# Patient Record
Sex: MALE
[De-identification: names, ages, dates, MRNs, and addresses within clinical notes are randomized per-mention and may not be internally consistent; named-entity substitution may affect disease eponyms.]

---

## 2017-03-25 ENCOUNTER — HOSPITAL ENCOUNTER (EMERGENCY)
Dept: HOSPITAL 14 - H.ER | Age: 67
Discharge: HOME | End: 2017-03-25
Payer: MEDICARE

## 2017-03-25 VITALS
RESPIRATION RATE: 16 BRPM | OXYGEN SATURATION: 99 % | TEMPERATURE: 98.6 F | SYSTOLIC BLOOD PRESSURE: 150 MMHG | DIASTOLIC BLOOD PRESSURE: 72 MMHG

## 2017-03-25 VITALS — HEART RATE: 77 BPM

## 2017-03-25 DIAGNOSIS — I12.9: ICD-10-CM

## 2017-03-25 DIAGNOSIS — R07.9: Primary | ICD-10-CM

## 2017-03-25 DIAGNOSIS — R05: ICD-10-CM

## 2017-03-25 DIAGNOSIS — Z79.4: ICD-10-CM

## 2017-03-25 DIAGNOSIS — E10.9: ICD-10-CM

## 2017-03-25 DIAGNOSIS — N39.0: ICD-10-CM

## 2017-03-25 DIAGNOSIS — E78.00: ICD-10-CM

## 2017-03-25 DIAGNOSIS — N18.9: ICD-10-CM

## 2017-03-25 LAB
BASOPHILS # BLD AUTO: 0.1 K/UL (ref 0–0.2)
BASOPHILS NFR BLD: 0.7 % (ref 0–2)
BUN SERPL-MCNC: 43 MG/DL (ref 9–20)
CALCIUM SERPL-MCNC: 9.2 MG/DL (ref 8.4–10.2)
CHLORIDE SERPL-SCNC: 111 MMOL/L (ref 98–107)
CO2 SERPL-SCNC: 19 MMOL/L (ref 22–30)
EOSINOPHIL # BLD AUTO: 0.2 K/UL (ref 0–0.7)
EOSINOPHIL NFR BLD: 2.2 % (ref 0–4)
ERYTHROCYTE [DISTWIDTH] IN BLOOD BY AUTOMATED COUNT: 14.5 % (ref 11.5–14.5)
GLUCOSE SERPL-MCNC: 173 MG/DL (ref 75–110)
HCT VFR BLD CALC: 35.7 % (ref 35–51)
LYMPHOCYTES # BLD AUTO: 2.1 K/UL (ref 1–4.3)
LYMPHOCYTES NFR BLD AUTO: 20.1 % (ref 20–40)
MCH RBC QN AUTO: 29.7 PG (ref 27–31)
MCHC RBC AUTO-ENTMCNC: 32.5 G/DL (ref 33–37)
MCV RBC AUTO: 91.4 FL (ref 80–94)
MONOCYTES # BLD: 1.2 K/UL (ref 0–0.8)
MONOCYTES NFR BLD: 11.1 % (ref 0–10)
NEUTROPHILS # BLD: 7 K/UL (ref 1.8–7)
NEUTROPHILS NFR BLD AUTO: 65.9 % (ref 50–75)
NRBC BLD AUTO-RTO: 0 % (ref 0–0)
PLATELET # BLD: 443 K/UL (ref 130–400)
PMV BLD AUTO: 7.9 FL (ref 7.2–11.7)
POTASSIUM SERPL-SCNC: 4.9 MMOL/L (ref 3.6–5)
SODIUM SERPL-SCNC: 146 MMOL/L (ref 132–148)
TROPONIN I SERPL-MCNC: 0.01 NG/ML (ref 0–0.12)
WBC # BLD AUTO: 10.6 K/UL (ref 4.8–10.8)

## 2017-03-25 PROCEDURE — 96372 THER/PROPH/DIAG INJ SC/IM: CPT

## 2017-03-25 PROCEDURE — 99283 EMERGENCY DEPT VISIT LOW MDM: CPT

## 2017-03-25 PROCEDURE — 80048 BASIC METABOLIC PNL TOTAL CA: CPT

## 2017-03-25 PROCEDURE — 93005 ELECTROCARDIOGRAM TRACING: CPT

## 2017-03-25 PROCEDURE — 85025 COMPLETE CBC W/AUTO DIFF WBC: CPT

## 2017-03-25 PROCEDURE — 84484 ASSAY OF TROPONIN QUANT: CPT

## 2017-03-25 PROCEDURE — 83880 ASSAY OF NATRIURETIC PEPTIDE: CPT

## 2017-03-25 PROCEDURE — 71020: CPT

## 2017-03-25 NOTE — RAD
HISTORY:

 chest pain 



COMPARISON:

No prior.



TECHNIQUE:

Chest PA and lateral



FINDINGS:



LUNGS:

No active pulmonary disease.



PLEURA:

No significant pleural effusion identified. No pneumothorax apparent.



CARDIOVASCULAR:

Normal.



OSSEOUS STRUCTURES:

No significant abnormalities.



VISUALIZED UPPER ABDOMEN:

Normal.



OTHER FINDINGS:

None.



IMPRESSION:

No active disease.

## 2017-03-25 NOTE — ED PDOC
HPI: Chest Pain


Time Seen by Provider: 17 13:06


Chief Complaint (Nursing): Chest Pain


Chief Complaint (Provider): Chest pain


History Per: Patient


History/Exam Limitations: no limitations


Onset/Duration Of Symptoms: Days (5x)


Current Symptoms Are (Timing): Still Present


Severity: Moderate


Associated Symptoms: denies: Dyspnea, Syncope


Additional Complaint(s): 


66 year old male patient with a pertinent medical history of HTN and diabetes 

presents to the ED with complaints of left sided chest pain and a cough that 

started 5x days ago. He reports that he has phlegm, and his cough is productive 

of green sputum. The chest pain worsens when he coughs. He denies having 

shortness of breath, dizziness, syncope, and leg swelling. 





PMD: [facility]








Past Medical History


Reviewed: Historical Data, Nursing Documentation, Vital Signs


Vital Signs: 


 Last Vital Signs











Temp  98.6 F   17 13:00


 


Pulse  77   17 16:24


 


Resp  16   17 13:00


 


BP  150/72   17 13:00


 


Pulse Ox  99   17 16:24














- Medical History


PMH: Diabetes (type I and II), HTN, Hypercholesterolemia, Chronic Kidney Disease


   Denies: CAD, CVA





- Surgical History


Surgical History: Appendectomy


   Denies: CABG





- Family History


Family History: States: Unknown Family Hx





- Social History


Alcohol: None


Drugs: Denies





- Home Medications


Home Medications: 


 Ambulatory Orders











 Medication  Instructions  Recorded


 


Ciprofloxacin HCl [Cipro] 500 mg PO BID #14 tab 10/24/15


 


SITagliptin [Januvia] 50 mg PO DAILY #0 tab 10/24/15


 


cloNIDine [clonidine HCl] 0.1 mg PO BID #0 tab 10/24/15


 


Diphenhydramine Hydrochlorid 50 mg PO Q6 #20 cap 11/15/15





[Benadryl]  


 


Albuterol HFA [Ventolin HFA 90 2 puff IH [id number] PRN #1 inh 17





mcg/actuation (8 g)]  


 


Nitrofurantoin Macrocrystals 100 mg PO BID #14 cap 17





[Macrobid]  


 


Promethazine/Codeine 5 ml PO Q6 #100 ml 17





[Phenergan/Codeine Oral Syrup]  














- Allergies


Allergies/Adverse Reactions: 


 Allergies











Allergy/AdvReac Type Severity Reaction Status Date / Time


 


No Known Allergies Allergy   Verified 16 19:00














ANNETTE Risk Score for UA/NSTEMI





- ANNETTE Risk Score


Age > 64: YES


3 or more CAD Risk Factors: YES


Known CAD (Stenosis greater than 50%): NO


Aspirin use in past 7 days: NO


Severe Angina: NO


EKG ST changes greater than 0.5mm: NO


Positive Cardiac Marker: NO


ANNETTE Score: 2


Risk %: 8%





Wells Criteria for PE





- Wells Criteria for Pulmonary Embolism


Clinical Signs and Symptoms of DVT: No


P.E is #1 Diagnosis, or Equally Likely: No


Heart Rate >100: No


Immobilization at least 3 days;Surgery previous 4 weeks: No


Previous, objectively diagnosed PE or DVT: No


Hemoptysis: No


Malignancy w/treatment within 6 months, or palliative: No


Total Score: 0





Review of Systems


ROS Statement: Except As Marked, All Systems Reviewed And Found Negative


Constitutional: Negative for: Fever


Cardiovascular: Positive for: Chest Pain


Respiratory: Positive for: Cough, Sputum (green).  Negative for: Shortness of 

Breath


Musculoskeletal: Negative for: Leg Pain (no leg swelling)


Neurological: Negative for: Dizziness, Other (syncope)





Physical Exam





- Reviewed


Nursing Documentation Reviewed: Yes


Vital Signs Reviewed: Yes





- Physical Exam


Appears: Positive for: Well, Non-toxic, No Acute Distress


Head Exam: Positive for: ATRAUMATIC, NORMOCEPHALIC


Skin: Positive for: Normal Color, Warm, Dry


Cardiovascular/Chest: Positive for: Regular Rate, Rhythm.  Negative for: Chest 

Non Tender (left side chest wall is tender to palpation)


Respiratory: Positive for: Normal Breath Sounds.  Negative for: Respiratory 

Distress


Gastrointestinal/Abdominal: Positive for: Normal Exam, Soft.  Negative for: 

Tenderness


Extremity: Positive for: Normal ROM.  Negative for: Tenderness (no leg 

tenderness), Swelling (no leg swelling)


Neurologic/Psych: Positive for: Alert, Oriented (3x)





- Laboratory Results


Result Diagrams: 


 17 13:45





 17 13:45


Urine dip results: Positive for: Leukocyte Esterase, Blood





- ECG


ECG: Positive for: Interpreted By Me, Viewed By Me


ECG Rhythm: Positive for: Normal QRS, Normal ST Segment, Sinus Rhythm.  

Negative for: ST/T Changes


Rate: 77


O2 Sat by Pulse Oximetry: 99 (RA)


Pulse Ox Interpretation: Normal





- Radiology


X-Ray: Viewed By Me, Read By Radiologist


X-Ray Interpretation: No Acute Disease





Medical Decision Making


Medical Decision Makin:06


Initial impression: 66 year old male with chest pain and a cough. Differential 

diagnoses include but are not limited to bronchitis, pneumonia, and 

musculoskeletal chest pain. Less than likely but still considered: ACS


Initial plan:


* XRay chest 2 views


*  EKG


* b-type natriuretic peptide


* BMP


* troponin I


* udip


* CBC with differential


* phenergan/codeine 5ml PO


* toradol 30mg IM


* US transvaginal


* reevaluation


 


--------------------------------------------------------------------------------

----------------- 


Scribe Attestation:


Documented by Tawanna Gary, acting as a scribe for Vanessa Sawyer MD.


 


Provider Scribe Attestation:


All medical record entries made by the Scribe were at my direction and 

personally dictated by me. I have reviewed the chart and agree that the record 

accurately reflects my personal performance of the history, physical exam, 

medical decision making, and the department course for this patient. I have 

also personally directed, reviewed, and agree with the discharge instructions 

and disposition.


 





Disposition





- Clinical Impression


Clinical Impression: 


 UTI (urinary tract infection), Chest pain





- Patient ED Disposition


Is Patient to be Admitted: No


Doctor Will See Patient In The: Office


Counseled Patient/Family Regarding: Studies Performed, Diagnosis, Need For 

Followup





- Disposition


Referrals: 


Robert Weaver MD [Family Provider] - 


Disposition: Routine/Home


Disposition Time: 16:40


Condition: GOOD


Additional Instructions: 


Follow up with your PCP in 2-3 days. Return for worsening. 


Prescriptions: 


Albuterol HFA [Ventolin HFA 90 mcg/actuation (8 g)] 2 puff IH [id number] PRN #1 inh


 PRN Reason: Cough


Nitrofurantoin Macrocrystals [Macrobid] 100 mg PO BID #14 cap


Promethazine/Codeine [Phenergan/Codeine Oral Syrup] 5 ml PO Q6 #100 ml


Instructions:  Urinary Tract Infection in Men (ED), Acute Bronchitis (ED)

## 2017-03-26 NOTE — CARD
--------------- APPROVED REPORT --------------





EKG Measurement

Heart [id number]

[place] [id number]

[id number]

[id number]

[id number]



<Conclusion>

Normal sinus rhythm

Left axis deviation

Abnormal ECG

## 2017-10-21 ENCOUNTER — HOSPITAL ENCOUNTER (OUTPATIENT)
Dept: HOSPITAL 14 - H.ER | Age: 67
Setting detail: OBSERVATION
LOS: 1 days | Discharge: HOME | End: 2017-10-22
Attending: FAMILY MEDICINE | Admitting: FAMILY MEDICINE
Payer: MEDICARE

## 2017-10-21 DIAGNOSIS — I12.9: ICD-10-CM

## 2017-10-21 DIAGNOSIS — N18.4: ICD-10-CM

## 2017-10-21 DIAGNOSIS — E87.5: ICD-10-CM

## 2017-10-21 DIAGNOSIS — R07.89: Primary | ICD-10-CM

## 2017-10-21 DIAGNOSIS — Z87.891: ICD-10-CM

## 2017-10-21 DIAGNOSIS — N25.81: ICD-10-CM

## 2017-10-21 DIAGNOSIS — E11.22: ICD-10-CM

## 2017-10-21 DIAGNOSIS — E11.21: ICD-10-CM

## 2017-10-21 LAB
ALBUMIN/GLOB SERPL: 1.3 {RATIO} (ref 1–2.1)
ALP SERPL-CCNC: 75 U/L (ref 38–126)
ALT SERPL-CCNC: 28 U/L (ref 21–72)
APTT BLD: 29.3 SECONDS (ref 25.6–37.1)
AST SERPL-CCNC: 47 U/L (ref 17–59)
BASOPHILS # BLD AUTO: 0.1 K/UL (ref 0–0.2)
BASOPHILS NFR BLD: 1 % (ref 0–2)
BILIRUB SERPL-MCNC: 0.4 MG/DL (ref 0.2–1.3)
BUN SERPL-MCNC: 51 MG/DL (ref 9–20)
BUN SERPL-MCNC: 61 MG/DL (ref 9–20)
CALCIUM SERPL-MCNC: 9.1 MG/DL (ref 8.4–10.2)
CALCIUM SERPL-MCNC: 9.8 MG/DL (ref 8.4–10.2)
CHLORIDE SERPL-SCNC: 109 MMOL/L (ref 98–107)
CHLORIDE SERPL-SCNC: 111 MMOL/L (ref 98–107)
CO2 SERPL-SCNC: 20 MMOL/L (ref 22–30)
CO2 SERPL-SCNC: 23 MMOL/L (ref 22–30)
EOSINOPHIL # BLD AUTO: 0.7 K/UL (ref 0–0.7)
EOSINOPHIL NFR BLD: 7.4 % (ref 0–4)
ERYTHROCYTE [DISTWIDTH] IN BLOOD BY AUTOMATED COUNT: 13.8 % (ref 11.5–14.5)
GLOBULIN SER-MCNC: 3.5 GM/DL (ref 2.2–3.9)
GLUCOSE SERPL-MCNC: 103 MG/DL (ref 75–110)
GLUCOSE SERPL-MCNC: 113 MG/DL (ref 75–110)
HCT VFR BLD CALC: 33.7 % (ref 35–51)
LYMPHOCYTES # BLD AUTO: 1.7 K/UL (ref 1–4.3)
LYMPHOCYTES NFR BLD AUTO: 18.3 % (ref 20–40)
MCH RBC QN AUTO: 30.1 PG (ref 27–31)
MCHC RBC AUTO-ENTMCNC: 33.1 G/DL (ref 33–37)
MCV RBC AUTO: 90.9 FL (ref 80–94)
MONOCYTES # BLD: 1.1 K/UL (ref 0–0.8)
MONOCYTES NFR BLD: 12 % (ref 0–10)
NEUTROPHILS # BLD: 5.7 K/UL (ref 1.8–7)
NEUTROPHILS NFR BLD AUTO: 61.3 % (ref 50–75)
NRBC BLD AUTO-RTO: 0.1 % (ref 0–0)
PLATELET # BLD: 398 K/UL (ref 130–400)
PMV BLD AUTO: 8.3 FL (ref 7.2–11.7)
POTASSIUM SERPL-SCNC: 4.9 MMOL/L (ref 3.6–5)
POTASSIUM SERPL-SCNC: 5.3 MMOL/L (ref 3.6–5)
PROT SERPL-MCNC: 8.3 G/DL (ref 6.3–8.2)
SODIUM SERPL-SCNC: 143 MMOL/L (ref 132–148)
SODIUM SERPL-SCNC: 145 MMOL/L (ref 132–148)
TROPONIN I SERPL-MCNC: 0.01 NG/ML (ref 0–0.12)
TROPONIN I SERPL-MCNC: 0.01 NG/ML (ref 0–0.12)
WBC # BLD AUTO: 9.2 K/UL (ref 4.8–10.8)

## 2017-10-21 PROCEDURE — 82948 REAGENT STRIP/BLOOD GLUCOSE: CPT

## 2017-10-21 PROCEDURE — 93005 ELECTROCARDIOGRAM TRACING: CPT

## 2017-10-21 PROCEDURE — 80053 COMPREHEN METABOLIC PANEL: CPT

## 2017-10-21 PROCEDURE — 85610 PROTHROMBIN TIME: CPT

## 2017-10-21 PROCEDURE — 94640 AIRWAY INHALATION TREATMENT: CPT

## 2017-10-21 PROCEDURE — 99285 EMERGENCY DEPT VISIT HI MDM: CPT

## 2017-10-21 PROCEDURE — 85025 COMPLETE CBC W/AUTO DIFF WBC: CPT

## 2017-10-21 PROCEDURE — 71010: CPT

## 2017-10-21 PROCEDURE — 84484 ASSAY OF TROPONIN QUANT: CPT

## 2017-10-21 PROCEDURE — 85730 THROMBOPLASTIN TIME PARTIAL: CPT

## 2017-10-21 PROCEDURE — 36415 COLL VENOUS BLD VENIPUNCTURE: CPT

## 2017-10-21 RX ADMIN — Medication SCH TAB: at 11:45

## 2017-10-21 NOTE — CP.PCM.CON
History of Present Illness





- History of Present Illness


History of Present Illness: 





pt is seen and examined, full consult is dictated#[id number]


1. htn


2. CKD-4


3. Type-2 dm


4. rt sided chest discomfort





Past Patient History





- Infectious Disease


Hx of Infectious Diseases: None





- Past Medical History & Family History


Past Medical History?: Yes





- Past Social History


Smoking Status: Never Smoked





- CARDIAC


Hx Cardiac Disorders: Yes


Hx Hypertension: Yes





- PULMONARY


Hx Respiratory Disorders: No





- NEUROLOGICAL


Hx Neurological Disorder: No





- HEENT


Hx HEENT Problems: No





- RENAL


Hx Chronic Kidney Disease: Yes





- ENDOCRINE/METABOLIC


Hx Endocrine Disorders: Yes


Hx Diabetes Mellitus Type 2: Yes





- HEMATOLOGICAL/ONCOLOGICAL


Hx Blood Disorders: No





- INTEGUMENTARY


Hx Dermatological Problems: No





- MUSCULOSKELETAL/RHEUMATOLOGICAL


Hx Musculoskeletal Disorders: No


Hx Falls: No





- GASTROINTESTINAL


Hx Gastrointestinal Disorders: No





- GENITOURINARY/GYNECOLOGICAL


Hx Genitourinary Disorders: No





- PSYCHIATRIC


Hx Psychophysiologic Disorder: No


Hx Substance Use: No





- SURGICAL HISTORY


Hx Appendectomy: Yes


Hx Coronary Artery Bypass Graft: No





- ANESTHESIA


Hx Anesthesia: Yes


Hx Anesthesia Reactions: No





Meds


Allergies/Adverse Reactions: 


 Allergies











Allergy/AdvReac Type Severity Reaction Status Date / Time


 


No Known Allergies Allergy   Verified 01/19/16 19:00














- Medications


Medications: 


 Current Medications





Amlodipine Besylate (Norvasc)  10 mg PO DAILY [facility]


Aspirin (Aspirin Chewable)  81 mg PO DAILY [facility]


Calcitriol (Rocaltrol)  0.25 mcg PO MWF [facility]


Clonidine HCl (Catapres)  0.1 mg PO BID [facility]


HCTZ/Losartan Potassium (Hyzaar 12.5 Mg-50 Mg)  2 tab PO DAILY [facility]


   Last Admin: 10/21/17 11:45 Dose:  2 tab


Pioglitazone HCl (Actos)  15 mg PO DAILY [facility]


   Last Admin: 10/21/17 11:45 Dose:  15 mg


Sodium Bicarbonate (Sodium Bicarbonate Tab)  1,300 mg PO BID [facility]


Tamsulosin HCl (Flomax)  0.4 mg PO DAILY [facility]


   Last Admin: 10/21/17 11:44 Dose:  0.4 mg











Results





- Vital Signs


Recent Vital Signs: 


 Last Vital Signs











Temp  98.5 F   10/21/17 16:54


 


Pulse  68   10/21/17 16:54


 


Resp  16   10/21/17 16:54


 


BP  149/65   10/21/17 16:54


 


Pulse Ox  99   10/21/17 16:54














- Labs


Result Diagrams: 


 10/21/17 07:30





 10/21/17 17:00


Labs: 


 Laboratory Results - last 24 hr











  10/21/17 10/21/17 10/21/17





  07:30 07:30 07:30


 


WBC  9.2  


 


RBC  3.70 L  


 


Hgb  11.2 L  


 


Hct  33.7 L  


 


MCV  90.9  


 


MCH  30.1  


 


MCHC  33.1  


 


RDW  13.8  


 


Plt Count  398  


 


MPV  8.3  


 


Neut % (Auto)  61.3  


 


Lymph % (Auto)  18.3 L  


 


Mono % (Auto)  12.0 H  


 


Eos % (Auto)  7.4 H  


 


Baso % (Auto)  1.0  


 


Neut #  5.7  


 


Lymph #  1.7  


 


Mono #  1.1 H  


 


Eos #  0.7  


 


Baso #  0.1  


 


PT    10.8


 


INR    1.0


 


APTT    29.3


 


Sodium   143 


 


Potassium   5.3 H 


 


Chloride   109 H 


 


Carbon Dioxide   20 L 


 


Anion Gap   19 


 


BUN   61 H 


 


Creatinine   3.2 H 


 


Est GFR ( Amer)   23 


 


Est GFR (Non-Af Amer)   19 


 


POC Glucose (mg/dL)   


 


Random Glucose   113 H 


 


Calcium   9.8 


 


Total Bilirubin   0.4 


 


AST   47 


 


ALT   28 


 


Alkaline Phosphatase   75 


 


Troponin I   0.0120 


 


Total Protein   8.3 H 


 


Albumin   4.8 


 


Globulin   3.5 


 


Albumin/Globulin Ratio   1.3 














  10/21/17 10/21/17





  12:23 16:07


 


WBC  


 


RBC  


 


Hgb  


 


Hct  


 


MCV  


 


MCH  


 


MCHC  


 


RDW  


 


Plt Count  


 


MPV  


 


Neut % (Auto)  


 


Lymph % (Auto)  


 


Mono % (Auto)  


 


Eos % (Auto)  


 


Baso % (Auto)  


 


Neut #  


 


Lymph #  


 


Mono #  


 


Eos #  


 


Baso #  


 


PT  


 


INR  


 


APTT  


 


Sodium  


 


Potassium  


 


Chloride  


 


Carbon Dioxide  


 


Anion Gap  


 


BUN  


 


Creatinine  


 


Est GFR ( Amer)  


 


Est GFR (Non-Af Amer)  


 


POC Glucose (mg/dL)  161 H  109


 


Random Glucose  


 


Calcium  


 


Total Bilirubin  


 


AST  


 


ALT  


 


Alkaline Phosphatase  


 


Troponin I  


 


Total Protein  


 


Albumin  


 


Globulin  


 


Albumin/Globulin Ratio

## 2017-10-21 NOTE — CP.PCM.CON
History of Present Illness





- History of Present Illness


History of Present Illness: 





patietn seen/examined.  





will obtain serial cardiac enzymes.


if negative will consider outpatient stress test.





Past Patient History





- Infectious Disease


Hx of Infectious Diseases: None





- Past Medical History & Family History


Past Medical History?: Yes





- Past Social History


Smoking Status: Never Smoked





- CARDIAC


Hx Cardiac Disorders: Yes


Hx Hypertension: Yes





- PULMONARY


Hx Respiratory Disorders: No





- NEUROLOGICAL


Hx Neurological Disorder: No





- HEENT


Hx HEENT Problems: No





- RENAL


Hx Chronic Kidney Disease: Yes





- ENDOCRINE/METABOLIC


Hx Endocrine Disorders: Yes


Hx Diabetes Mellitus Type 2: Yes





- HEMATOLOGICAL/ONCOLOGICAL


Hx Blood Disorders: No





- INTEGUMENTARY


Hx Dermatological Problems: No





- MUSCULOSKELETAL/RHEUMATOLOGICAL


Hx Musculoskeletal Disorders: No


Hx Falls: No





- GASTROINTESTINAL


Hx Gastrointestinal Disorders: No





- GENITOURINARY/GYNECOLOGICAL


Hx Genitourinary Disorders: No





- PSYCHIATRIC


Hx Psychophysiologic Disorder: No


Hx Substance Use: No





- SURGICAL HISTORY


Hx Appendectomy: Yes


Hx Coronary Artery Bypass Graft: No





- ANESTHESIA


Hx Anesthesia: Yes


Hx Anesthesia Reactions: No





Meds


Allergies/Adverse Reactions: 


 Allergies











Allergy/AdvReac Type Severity Reaction Status Date / Time


 


No Known Allergies Allergy   Verified 01/19/16 19:00














- Medications


Medications: 


 Current Medications





Amlodipine Besylate (Norvasc)  10 mg PO DAILY [facility]


Aspirin (Aspirin Chewable)  81 mg PO DAILY [facility]


Calcitriol (Rocaltrol)  0.25 mcg PO MWF [facility]


Clonidine HCl (Catapres)  0.1 mg PO BID [facility]


HCTZ/Losartan Potassium (Hyzaar 12.5 Mg-50 Mg)  2 tab PO DAILY [facility]


   Last Admin: 10/21/17 11:45 Dose:  2 tab


Pioglitazone HCl (Actos)  15 mg PO DAILY [facility]


   Last Admin: 10/21/17 11:45 Dose:  15 mg


Sodium Bicarbonate (Sodium Bicarbonate Tab)  1,300 mg PO BID [facility]


Tamsulosin HCl (Flomax)  0.4 mg PO DAILY [facility]


   Last Admin: 10/21/17 11:44 Dose:  0.4 mg











Results





- Vital Signs


Recent Vital Signs: 


 Last Vital Signs











Temp  97.9 F   10/21/17 12:30


 


Pulse  79   10/21/17 12:30


 


Resp  18   10/21/17 12:30


 


BP  166/69 H  10/21/17 12:30


 


Pulse Ox  99   10/21/17 12:30














- Labs


Result Diagrams: 


 10/21/17 07:30





 10/21/17 07:30


Labs: 


 Laboratory Results - last 24 hr











  10/21/17 10/21/17 10/21/17





  07:30 07:30 07:30


 


WBC  9.2  


 


RBC  3.70 L  


 


Hgb  11.2 L  


 


Hct  33.7 L  


 


MCV  90.9  


 


MCH  30.1  


 


MCHC  33.1  


 


RDW  13.8  


 


Plt Count  398  


 


MPV  8.3  


 


Neut % (Auto)  61.3  


 


Lymph % (Auto)  18.3 L  


 


Mono % (Auto)  12.0 H  


 


Eos % (Auto)  7.4 H  


 


Baso % (Auto)  1.0  


 


Neut #  5.7  


 


Lymph #  1.7  


 


Mono #  1.1 H  


 


Eos #  0.7  


 


Baso #  0.1  


 


PT    10.8


 


INR    1.0


 


APTT    29.3


 


Sodium   143 


 


Potassium   5.3 H 


 


Chloride   109 H 


 


Carbon Dioxide   20 L 


 


Anion Gap   19 


 


BUN   61 H 


 


Creatinine   3.2 H 


 


Est GFR ( Amer)   23 


 


Est GFR (Non-Af Amer)   19 


 


POC Glucose (mg/dL)   


 


Random Glucose   113 H 


 


Calcium   9.8 


 


Total Bilirubin   0.4 


 


AST   47 


 


ALT   28 


 


Alkaline Phosphatase   75 


 


Troponin I   0.0120 


 


Total Protein   8.3 H 


 


Albumin   4.8 


 


Globulin   3.5 


 


Albumin/Globulin Ratio   1.3 














  10/21/17





  12:23


 


WBC 


 


RBC 


 


Hgb 


 


Hct 


 


MCV 


 


MCH 


 


MCHC 


 


RDW 


 


Plt Count 


 


MPV 


 


Neut % (Auto) 


 


Lymph % (Auto) 


 


Mono % (Auto) 


 


Eos % (Auto) 


 


Baso % (Auto) 


 


Neut # 


 


Lymph # 


 


Mono # 


 


Eos # 


 


Baso # 


 


PT 


 


INR 


 


APTT 


 


Sodium 


 


Potassium 


 


Chloride 


 


Carbon Dioxide 


 


Anion Gap 


 


BUN 


 


Creatinine 


 


Est GFR ( Amer) 


 


Est GFR (Non-Af Amer) 


 


POC Glucose (mg/dL)  161 H


 


Random Glucose 


 


Calcium 


 


Total Bilirubin 


 


AST 


 


ALT 


 


Alkaline Phosphatase 


 


Troponin I 


 


Total Protein 


 


Albumin 


 


Globulin 


 


Albumin/Globulin Ratio

## 2017-10-21 NOTE — RAD
HISTORY:

dyspnea  



COMPARISON:

No prior. 



FINDINGS:



LUNGS:

No active pulmonary disease.



PLEURA:

No significant pleural effusion identified, no pneumothorax apparent.



CARDIOVASCULAR:

Normal.



OSSEOUS STRUCTURES:

No significant abnormalities.



VISUALIZED UPPER ABDOMEN:

Normal.



OTHER FINDINGS:

None.



IMPRESSION:

No active disease.

## 2017-10-21 NOTE — CP.PCM.HP
History of Present Illness





- History of Present Illness


History of Present Illness: 





pt admittedfor cpx 2 days. reports incr w/ movement and is anxious about family 

probelms.  no pain at present. bw noted. acute on chronic ckd noted. 

hyperkalemia noted.  no f/c, n/v/d.


neprhoa nd cardio consults pending


1st trop noted





Present on Admission





- Present on Admission


Any Indicators Present on Admission: Yes


History of Uncontrolled Diabetes: Yes





Review of Systems





- Cardiovascular


Cardiovascular: As Per HPI, Chest Pain with Activity





Past Patient History





- Infectious Disease


Hx of Infectious Diseases: None





- Past Medical History & Family History


Past Medical History?: Yes





- Past Social History


Smoking Status: Former Smoker





- CARDIAC


Hx Cardiac Disorders: Yes





- PULMONARY


Hx Respiratory Disorders: Yes





- RENAL


Hx Chronic Kidney Disease: Yes





- ENDOCRINE/METABOLIC


Hx Endocrine Disorders: Yes





- MUSCULOSKELETAL/RHEUMATOLOGICAL


Hx Falls: No





- PSYCHIATRIC


Hx Substance Use: No





- SURGICAL HISTORY


Hx Appendectomy: Yes


Hx Coronary Artery Bypass Graft: No





- ANESTHESIA


Hx Anesthesia: Yes


Hx Anesthesia Reactions: No





Meds


Allergies/Adverse Reactions: 


 Allergies











Allergy/AdvReac Type Severity Reaction Status Date / Time


 


No Known Allergies Allergy   Verified 01/19/16 19:00














Physical Exam





- Constitutional


Appears: Well, Non-toxic, No Acute Distress





- Head Exam


Head Exam: ATRAUMATIC, NORMAL INSPECTION, NORMOCEPHALIC





- Eye Exam


Eye Exam: EOMI, Normal appearance, PERRL


Pupil Exam: NORMAL ACCOMODATION, PERRL





- ENT Exam


ENT Exam: Mucous Membranes Moist, Normal Exam





- Neck Exam


Neck exam: Positive for: Normal Inspection





- Respiratory Exam


Respiratory Exam: Clear to Auscultation Bilateral, NORMAL BREATHING PATTERN





- Cardiovascular Exam


Cardiovascular Exam: REGULAR RHYTHM, RRR, +S1





- GI/Abdominal Exam


GI & Abdominal Exam: Normal Bowel Sounds, Soft.  absent: Tenderness





- Extremities Exam


Extremities exam: Positive for: full ROM, normal capillary refill, normal 

inspection, pedal pulses present





- Back Exam


Back exam: FULL ROM, NORMAL INSPECTION





- Neurological Exam


Neurological exam: Alert, CN II-XII Intact, Normal Gait, Oriented x3, Reflexes 

Normal





- Psychiatric Exam


Psychiatric exam: Normal Affect, Normal Mood





- Skin


Skin Exam: Dry, Intact, Normal Color, Warm





Results





- Vital Signs


Recent Vital Signs: 





 Last Vital Signs











Temp  98.4 F   10/21/17 06:39


 


Pulse  83   10/21/17 10:15


 


Resp  18   10/21/17 10:15


 


BP  160/70 H  10/21/17 10:15


 


Pulse Ox  98   10/21/17 10:14














- Labs


Result Diagrams: 


 10/21/17 07:30





 10/21/17 07:30


Labs: 





 Laboratory Results - last 24 hr











  10/21/17 10/21/17 10/21/17





  07:30 07:30 07:30


 


WBC  9.2  


 


RBC  3.70 L  


 


Hgb  11.2 L  


 


Hct  33.7 L  


 


MCV  90.9  


 


MCH  30.1  


 


MCHC  33.1  


 


RDW  13.8  


 


Plt Count  398  


 


MPV  8.3  


 


Neut % (Auto)  61.3  


 


Lymph % (Auto)  18.3 L  


 


Mono % (Auto)  12.0 H  


 


Eos % (Auto)  7.4 H  


 


Baso % (Auto)  1.0  


 


Neut #  5.7  


 


Lymph #  1.7  


 


Mono #  1.1 H  


 


Eos #  0.7  


 


Baso #  0.1  


 


PT    10.8


 


INR    1.0


 


APTT    29.3


 


Sodium   143 


 


Potassium   5.3 H 


 


Chloride   109 H 


 


Carbon Dioxide   20 L 


 


Anion Gap   19 


 


BUN   61 H 


 


Creatinine   3.2 H 


 


Est GFR ( Amer)   23 


 


Est GFR (Non-Af Amer)   19 


 


Random Glucose   113 H 


 


Calcium   9.8 


 


Total Bilirubin   0.4 


 


AST   47 


 


ALT   28 


 


Alkaline Phosphatase   75 


 


Troponin I   0.0120 


 


Total Protein   8.3 H 


 


Albumin   4.8 


 


Globulin   3.5 


 


Albumin/Globulin Ratio   1.3 














Assessment & Plan


(1) DVT prophylaxis


Assessment and Plan: 


scd nad aehose


ambulation


Status: Acute   





(2) Acute chest pain


Assessment and Plan: 


trops x 3


cardio


asa


Status: Acute   





(3) Hyperkalemia


Assessment and Plan: 


rec'd akyexalate in er


nephro


cmp [id number]


Status: Acute   





(4) CKD (chronic kidney disease)


Assessment and Plan: 


npehro


trend bun/cr


Status: Acute   





Decision To Admit





- Pt Status Changed To:


Hospital Disposition Of: Observation





- .


Bed Request Type: Telemetry


Admitting Physician: Paris Cortez

## 2017-10-21 NOTE — CP.PCM.CON
History of Present Illness





- History of Present Illness


History of Present Illness: 





I was asked to see patient by Dr Gomez and Dr Cortez.








Patient is a 67 year old male with PMH HTN, DM, reanl failure who presents with 

chest pain.  He describes a right sided chest pain, worse with inspiration.  

The patient states this was his initial episode.  He denies dyspnea with 

exertion.





Review of Systems





- Constitutional


Constitutional: absent: As Per HPI, Anorexia, Chills, Daytime Sleepiness, 

Excessive Sweating, Fatigue, Fever, Frequent Falls, Headache, Increased Appetite

, Lethargy, Malaise, Night Sweats, Snoring, Sleep Apnea, Weight Gain, Weight 

Loss, Weakness, Other





- EENT


Eyes: absent: As Per HPI, Blind Spots, Blurred Vision, Change in Vision, 

Decreased Night Vision, Diplopia, Discharge, Dry Eye, Exophthalmos, Floaters, 

Irritation, Itchy Eyes, Loss of Peripheral Vision, Pain, Photophobia, Requires 

Corrective Lenses, Sees Flashes, Spots in Vision, Tunnel Vision, Other Visual 

Disturbances, Loss of Vision, Other


Ears: absent: As Per HPI, Decreased Hearing, Ear Discharge, Ear Pain, Tinnitus, 

Abnormal Hearing, Disequilibrium, Dizziness, Other


Nose/Mouth/Throat: absent: As Per HPI, Epistaxis, Nasal Congestion, Nasal 

Discharge, Nasal Obstruction, Nasal Trauma, Nose Pain, Post Nasal Drip, Sinus 

Pain, Sinus Pressure, Bleeding Gums, Change in Voice, Dental Pain, Dry Mouth, 

Dysphagia, Halitosis, Hoarsness, Lip Swelling, Mouth Lesions, Mouth Pain, 

Odynophagia, Sore Throat, Throat Swelling, Tongue Swelling, Facial Pain, Neck 

Pain, Neck Mass, Other





- Cardiovascular


Cardiovascular: Chest Pain





- Respiratory


Respiratory: absent: As Per HPI, Cough, Dyspnea, Hemoptysis, Dyspnea on Exertion

, Wheezing, Snoring, Stridor, Pain on Inspiration, Chest Congestion, Excessive 

Mucous Production, Change in Mucous Color, Pain with Coughing, Other





- Gastrointestinal


Gastrointestinal: absent: As Per HPI, Abdominal Pain, Belching, Bloating, 

Change in Bowel Habits, Change in Stool Character, Coffee Ground Emesis, 

Constipation, Cramping, Diarrhea, Dyspepsia, Dysphagia, Early Satiety, 

Excessive Flatus, Fecal Incontinence, Heartburn, Hematemesis, Hematochezia, 

Loose Stools, Melena, Nausea, Odynophagia, Temesmus, Vomiting, Other





- Genitourinary


Genitourinary: absent: As Per HPI, Change in Urinary Stream, Difficulty 

Urinating, Dysuria, Flank Pain, Hematuria, Pyuria, Nocturia, Urinary 

Incontinence, Urinary Frequency, Urinary Hesitance, Urinary Urgency, Voiding 

Freq/Small Amts, Freq UTI, Hx Renal/Bladder Calculi, Hx /Renal Surgery, 

Bladder Distension, Other





- Musculoskeletal


Musculoskeletal: absent: As Per HPI, Abnormal Gait, Arthralgias, Atrophy, Back 

Pain, Deformity, Joint Swelling, Limited Range of Motion, Loss of Height, 

Muscle Cramps, Muscle Weakness, Myalgias, Neck Pain, Numbness, Radiating Pain 

into Limb, Stiffness, Tingling, Other





- Integumentary


Integumentary: absent: As Per HPI, Acne, Alopecia, Bleeding Lesions, Change in 

Hair, Change in Nails, Change in Pigmentation, Changing Lesions, Dry Skin, 

Erythema, Furuncle, Hirsutism, Lesions, New Lesions, Non-Healing Lesions, 

Photosensitivity, Pruritus, Rash, Skin Pain, Skin Ulcer, Sores, Striae, Swelling

, Unusual Bruising, Wounds, Jaundice, Other





- Neurological


Neurological: absent: As Per HPI, Abnormal Gait, Abnormal Hearing, Abnormal 

Movements, Abnormal Speech, Behavioral Changes, Burning Sensations, Confusion, 

Convulsions, Disequilibrium, Dizziness, Numbness, Focal Weakness, Frequent Falls

, Headaches, Lack of Coordination, Loss of Vision, Memory Loss, Paresthesias, 

Radicular Pain, Restless Legs, Sensory Deficit, Syncope, Tingling, Tremor, 

Vertigo, Weakness, Other Visual Disturbances, Other





- Psychiatric


Psychiatric: absent: As Per HPI, Abnormal Sleep Pattern, Anhedonia, Anxiety, 

Auditory Hallucinations, Behavioral Changes, Change in Appetite, Change in 

Libido, Confusion, Depression, Difficulty Concentrating, Hallucinations, 

Homicidal Ideation, Hopelessness, Irritability, Memory Loss, Mood Swings, Panic 

Attacks, Paranoia, Suicidal Ideation, Visual Hallucinations, Tactile 

Hallucinations, Other





- Endocrine


Endocrine: absent: As Per HPI, Change in Body Appearance, Change in Libido, 

Cold Intolorance, Deepening of Voice, Excessive Sweating, Fatigue, Flushing, 

Heat Intolorance, Increase in Ring/Shoe/Hat Size, Palpitations, Polydipsia, 

Polyphagia, Polyuria, Other





- Hematologic/Lymphatic


Hematologic: absent: As Per HPI, Easy Bleeding, Easy Bruising, Lymphadenopathy, 

Other





Past Patient History





- Infectious Disease


Hx of Infectious Diseases: None





- Past Medical History & Family History


Past Medical History?: Yes





- Past Social History


Smoking Status: Never Smoked





- CARDIAC


Hx Cardiac Disorders: Yes


Hx Hypertension: Yes





- PULMONARY


Hx Respiratory Disorders: No





- NEUROLOGICAL


Hx Neurological Disorder: No





- HEENT


Hx HEENT Problems: No





- RENAL


Hx Chronic Kidney Disease: Yes





- ENDOCRINE/METABOLIC


Hx Endocrine Disorders: Yes


Hx Diabetes Mellitus Type 2: Yes





- HEMATOLOGICAL/ONCOLOGICAL


Hx Blood Disorders: No





- INTEGUMENTARY


Hx Dermatological Problems: No





- MUSCULOSKELETAL/RHEUMATOLOGICAL


Hx Musculoskeletal Disorders: No


Hx Falls: No





- GASTROINTESTINAL


Hx Gastrointestinal Disorders: No





- GENITOURINARY/GYNECOLOGICAL


Hx Genitourinary Disorders: No





- PSYCHIATRIC


Hx Psychophysiologic Disorder: No


Hx Substance Use: No





- SURGICAL HISTORY


Hx Appendectomy: Yes


Hx Coronary Artery Bypass Graft: No





- ANESTHESIA


Hx Anesthesia: Yes


Hx Anesthesia Reactions: No





Meds


Allergies/Adverse Reactions: 


 Allergies











Allergy/AdvReac Type Severity Reaction Status Date / Time


 


No Known Allergies Allergy   Verified 01/19/16 19:00














- Medications


Medications: 


 Current Medications





Amlodipine Besylate (Norvasc)  10 mg PO DAILY [facility]


Aspirin (Aspirin Chewable)  81 mg PO DAILY [facility]


Calcitriol (Rocaltrol)  0.25 mcg PO MWF [facility]


Clonidine HCl (Catapres)  0.1 mg PO BID [facility]


HCTZ/Losartan Potassium (Hyzaar 12.5 Mg-50 Mg)  2 tab PO DAILY [facility]


   Last Admin: 10/21/17 11:45 Dose:  2 tab


Pioglitazone HCl (Actos)  15 mg PO DAILY [facility]


   Last Admin: 10/21/17 11:45 Dose:  15 mg


Sodium Bicarbonate (Sodium Bicarbonate Tab)  1,300 mg PO BID [facility]


Tamsulosin HCl (Flomax)  0.4 mg PO DAILY [facility]


   Last Admin: 10/21/17 11:44 Dose:  0.4 mg











Physical Exam





- Constitutional


Appears: Non-toxic





- Head Exam


Head Exam: NORMAL INSPECTION





- Eye Exam


Eye Exam: Normal appearance





- ENT Exam


ENT Exam: Mucous Membranes Moist





- Neck Exam


Neck exam: Positive for: Full Rom





- Respiratory Exam


Respiratory Exam: NORMAL BREATHING PATTERN





- Cardiovascular Exam


Cardiovascular Exam: REGULAR RHYTHM





- GI/Abdominal Exam


GI & Abdominal Exam: Normal Bowel Sounds





- Rectal Exam


Rectal Exam: Deferred





- Extremities Exam


Extremities exam: Negative for: pedal edema





- Back Exam


Back exam: NORMAL INSPECTION





- Neurological Exam


Neurological exam: Alert, Oriented x3





- Psychiatric Exam


Psychiatric exam: Normal Affect





- Skin


Skin Exam: Normal Color





Results





- Vital Signs


Recent Vital Signs: 


 Last Vital Signs











Temp  97.9 F   10/21/17 12:30


 


Pulse  79   10/21/17 12:30


 


Resp  18   10/21/17 12:30


 


BP  166/69 H  10/21/17 12:30


 


Pulse Ox  99   10/21/17 12:30














- Labs


Result Diagrams: 


 10/22/17 07:20





 10/22/17 06:00


Labs: 


 Laboratory Results - last 24 hr











  10/21/17 10/21/17 10/21/17





  07:30 07:30 07:30


 


WBC  9.2  


 


RBC  3.70 L  


 


Hgb  11.2 L  


 


Hct  33.7 L  


 


MCV  90.9  


 


MCH  30.1  


 


MCHC  33.1  


 


RDW  13.8  


 


Plt Count  398  


 


MPV  8.3  


 


Neut % (Auto)  61.3  


 


Lymph % (Auto)  18.3 L  


 


Mono % (Auto)  12.0 H  


 


Eos % (Auto)  7.4 H  


 


Baso % (Auto)  1.0  


 


Neut #  5.7  


 


Lymph #  1.7  


 


Mono #  1.1 H  


 


Eos #  0.7  


 


Baso #  0.1  


 


PT    10.8


 


INR    1.0


 


APTT    29.3


 


Sodium   143 


 


Potassium   5.3 H 


 


Chloride   109 H 


 


Carbon Dioxide   20 L 


 


Anion Gap   19 


 


BUN   61 H 


 


Creatinine   3.2 H 


 


Est GFR ( Amer)   23 


 


Est GFR (Non-Af Amer)   19 


 


POC Glucose (mg/dL)   


 


Random Glucose   113 H 


 


Calcium   9.8 


 


Total Bilirubin   0.4 


 


AST   47 


 


ALT   28 


 


Alkaline Phosphatase   75 


 


Troponin I   0.0120 


 


Total Protein   8.3 H 


 


Albumin   4.8 


 


Globulin   3.5 


 


Albumin/Globulin Ratio   1.3 














  10/21/17





  12:23


 


WBC 


 


RBC 


 


Hgb 


 


Hct 


 


MCV 


 


MCH 


 


MCHC 


 


RDW 


 


Plt Count 


 


MPV 


 


Neut % (Auto) 


 


Lymph % (Auto) 


 


Mono % (Auto) 


 


Eos % (Auto) 


 


Baso % (Auto) 


 


Neut # 


 


Lymph # 


 


Mono # 


 


Eos # 


 


Baso # 


 


PT 


 


INR 


 


APTT 


 


Sodium 


 


Potassium 


 


Chloride 


 


Carbon Dioxide 


 


Anion Gap 


 


BUN 


 


Creatinine 


 


Est GFR ( Amer) 


 


Est GFR (Non-Af Amer) 


 


POC Glucose (mg/dL)  161 H


 


Random Glucose 


 


Calcium 


 


Total Bilirubin 


 


AST 


 


ALT 


 


Alkaline Phosphatase 


 


Troponin I 


 


Total Protein 


 


Albumin 


 


Globulin 


 


Albumin/Globulin Ratio 














- EKG Data


EKG Interpreted by: Myself


EKG shows normal: Sinus rhythm





Assessment & Plan


(1) Chest pain


Assessment and Plan: 


patient has risk factors for CAD.  Recommend serial cardiac enzymes.  If 

negative, patient can be discharged to home, and continue with outpatient work 

up (stress test).  recommend ASA 81 mg daily


Status: Acute   





(2) CKD (chronic kidney disease)


Assessment and Plan: 


renal eval


Status: Acute   





(3) HTN (hypertension)


Assessment and Plan: 


BP control.  avoid nephrotoxic agents


Status: Acute

## 2017-10-22 VITALS — SYSTOLIC BLOOD PRESSURE: 147 MMHG | DIASTOLIC BLOOD PRESSURE: 61 MMHG | HEART RATE: 54 BPM | TEMPERATURE: 97.7 F

## 2017-10-22 VITALS — RESPIRATION RATE: 18 BRPM | OXYGEN SATURATION: 98 %

## 2017-10-22 LAB
ALBUMIN/GLOB SERPL: 1.2 {RATIO} (ref 1–2.1)
ALP SERPL-CCNC: 62 U/L (ref 38–126)
ALT SERPL-CCNC: 31 U/L (ref 21–72)
AST SERPL-CCNC: 33 U/L (ref 17–59)
BASOPHILS # BLD AUTO: 0.1 K/UL (ref 0–0.2)
BASOPHILS NFR BLD: 0.9 % (ref 0–2)
BILIRUB SERPL-MCNC: 0.1 MG/DL (ref 0.2–1.3)
BUN SERPL-MCNC: 45 MG/DL (ref 9–20)
CALCIUM SERPL-MCNC: 8.9 MG/DL (ref 8.4–10.2)
CHLORIDE SERPL-SCNC: 111 MMOL/L (ref 98–107)
CO2 SERPL-SCNC: 23 MMOL/L (ref 22–30)
EOSINOPHIL # BLD AUTO: 0.6 K/UL (ref 0–0.7)
EOSINOPHIL NFR BLD: 5.7 % (ref 0–4)
ERYTHROCYTE [DISTWIDTH] IN BLOOD BY AUTOMATED COUNT: 13.9 % (ref 11.5–14.5)
GLOBULIN SER-MCNC: 3 GM/DL (ref 2.2–3.9)
GLUCOSE SERPL-MCNC: 95 MG/DL (ref 75–110)
HCT VFR BLD CALC: 29.4 % (ref 35–51)
LYMPHOCYTES # BLD AUTO: 1.5 K/UL (ref 1–4.3)
LYMPHOCYTES NFR BLD AUTO: 14.1 % (ref 20–40)
MCH RBC QN AUTO: 30.2 PG (ref 27–31)
MCHC RBC AUTO-ENTMCNC: 33.2 G/DL (ref 33–37)
MCV RBC AUTO: 90.9 FL (ref 80–94)
MONOCYTES # BLD: 1.1 K/UL (ref 0–0.8)
MONOCYTES NFR BLD: 10.7 % (ref 0–10)
NEUTROPHILS # BLD: 7.1 K/UL (ref 1.8–7)
NEUTROPHILS NFR BLD AUTO: 68.6 % (ref 50–75)
NRBC BLD AUTO-RTO: 0 % (ref 0–0)
PLATELET # BLD: 343 K/UL (ref 130–400)
PMV BLD AUTO: 8.6 FL (ref 7.2–11.7)
POTASSIUM SERPL-SCNC: 4.8 MMOL/L (ref 3.6–5)
PROT SERPL-MCNC: 6.5 G/DL (ref 6.3–8.2)
SODIUM SERPL-SCNC: 143 MMOL/L (ref 132–148)
WBC # BLD AUTO: 10.3 K/UL (ref 4.8–10.8)

## 2017-10-22 RX ADMIN — Medication SCH TAB: at 09:02

## 2017-10-22 NOTE — CON
RENAL CONSULTATION



REQUESTED BY:  Dr. Paris Cortez.



LOCATION:  The patient is located in room #401, bed #2.



REASON FOR CONSULTATION:  Chronic kidney disease, for further evaluation.



HISTORY OF PRESENT ILLNESS:  Mr. Bailey is 67-year-old elderly 

male with a past medical history significant for a longstanding

hypertension; type 2 diabetes; chronic kidney disease, stage IV, with

baseline creatinine about 2.5 to 2.8 with GFR between 15 mL to 20 mL, who

is followed in office, presented to the emergency room with chief

complaints of right-sided chest pain, started yesterday evening.  As per

the patient, he was doing exercise, pulling the rope and started having

pain on the right side of the chest, mostly infraaxillary area.  Denies any

radiation of the pain.  Denies any nausea or vomiting.  Denies any

diaphoresis.  Pain is worse with movement of the joints or movement of the

body.  Denies any fever or cough.



PAST MEDICAL HISTORY:  Significant for longstanding hypertension, type 2

diabetes, chronic kidney disease, secondary hyperparathyroidism with

baseline creatinine of 2.5 to 2.8.



PAST SURGICAL HISTORY:  Denies.



ALLERGIES:  NO KNOWN DRUG ALLERGIES.



SOCIAL HISTORY:  Denies any smoking, alcohol or drugs.



PERSONAL HISTORY:  Single.  No children.



FAMILY HISTORY:  Not significant.



CURRENT MEDICATIONS:  Include Actos 15 mg p.o. daily; aspirin 81 mg daily;

clonidine 0.1 mg p.o. b.i.d.; Flomax 0.4 mg daily; hydrochlorothiazide and

losartan, Hyzaar 12.5 mg/50 mg combination 2 tablets p.o. daily; amlodipine

10 mg p.o. daily; Rocaltrol 0.25 mcg 3 times a week; sodium bicarbonate 650

mg 2 tablets b.i.d. and IV fluids, normal saline 100 mL per hour.



REVIEW OF SYSTEMS:  Significant for right-sided chest discomfort.  All

other review of systems are reviewed and are negative.



PHYSICAL EXAMINATION:

VITAL SIGNS:  Blood pressure 149/65, pulse 68, respirations 16, temperature

98.5, saturation 99%.  Height 5 feet 6 inches and weight is 122 pounds.

GENERAL:  Mr. Bailey is a 67-year-old elderly male, moderately build,

moderately nourished, not in acute distress.

HEENT:  Pupils are normally reactive to light and accommodation. 

Conjunctivae pink.  Sclerae are anicteric.  Tongue is moist.  Trachea is

midline.

LUNGS:  Symmetric on both sides.  Bilateral breath sounds present.  Clear

to auscultation.

CARDIOVASCULAR SYSTEM:  Newcomb at the fifth intercostal space, midclavicular

line.  S1 an d S2 audible.  No murmur or gallop.

ABDOMEN:  Normal in appearance.  Soft, tympanic.  No guarding.  No

rigidity.  No hepatosplenomegaly.  No abdominal bruits.

CENTRAL NERVOUS SYSTEM:  The patient is alert, awake, oriented x3. 

Nonfocal neuro examination.  Cranial nerves II to XII grossly intact. 

Sensory and motor examination is within normal limits.

EXTREMITIES:  No cyanosis.  No clubbing.  No edema.  Deep tendon reflexes

are normal.



LABORATORY DATA:  Include as follows, as of 10/21/2017, WBC 9.2, hemoglobin

11.2, hematocrit is 33.7, platelets 398.  PT 10.8, PTT 29.3.  Sodium 143,

potassium 5.3, chloride 109, CO2 of 20, BUN 61, creatinine 3.2, glucose

113, calcium 9.8, total bilirubin 0.4, AST 47, ALT 28, alkaline phosphatase

75.  Troponin 0.012.  Total protein 8.3, albumin is 4.8.  Repeat labs as of

10/21/2017, sodium 145, potassium 4.9, chloride 111, CO2 of 23, BUN 51,

creatinine 3.0, glucose 103, calcium 9.1.  Second set of troponin is 0.013.

Chest x-ray, no active disease.



ASSESSMENT:  Mr. Bailey is a 67-year-old elderly  male with a

history of longstanding hypertension; type 2 diabetes; chronic kidney

disease, stage IV; secondary hyperparathyroidism, was admitted with chest

discomfort on the right side after pulling with rope and increased BUN and

creatinine.

1.  Renal failure, acute-on-chronic kidney disease, stage IV, most likely

secondary to diabetic nephropathy, cannot rule out underlying hypertensive

nephrosclerosis.

2.  Hypertension.

3.  Mild hyperkalemia.

4.  Type 2 diabetes.

5.  Secondary hyperparathyroidism.



PLAN:  Change IV fluids normal saline to half-normal saline at 70 mL per

hour and repeat BMP in a.m. and also continue cardiac enzymes q. 8 hours x3

as per the cardiology recommendations and stress test as an outpatient.



Thank you for allowing me to participate in your patient's care.







__________________________________________

Liliam Kulkarni MD





DD:  10/21/2017 19:42:23

DT:  10/22/2017 0:35:04

[facility] # [id number]

## 2017-10-22 NOTE — CP.PCM.DIS
Provider





- Provider


Date of Admission: 


10/21/17 09:23





Attending physician: 


Paris Cortez MD





Time Spent in preparation of Discharge (in minutes): 15





Diagnosis





- Discharge Diagnosis


(1) DVT prophylaxis


Status: Acute   





(2) Acute chest pain


Status: Acute   





(3) Hyperkalemia


Status: Acute   





(4) CKD (chronic kidney disease)


Status: Acute   





Hospital Course





- Lab Results


Lab Results: 


 Most Recent Lab Values











WBC  10.3 K/uL (4.8-10.8)   10/22/17  07:20    


 


RBC  3.23 Mil/uL (4.40-5.90)  L  10/22/17  07:20    


 


Hgb  9.7 g/dL (12.0-18.0)  L  10/22/17  07:20    


 


Hct  29.4 % (35.0-51.0)  L  10/22/17  07:20    


 


MCV  90.9 fl (80.0-94.0)   10/22/17  07:20    


 


MCH  30.2 pg (27.0-31.0)   10/22/17  07:20    


 


MCHC  33.2 g/dL (33.0-37.0)   10/22/17  07:20    


 


RDW  13.9 % (11.5-14.5)   10/22/17  07:20    


 


Plt Count  343 K/uL (130-400)   10/22/17  07:20    


 


MPV  8.6 fl (7.2-11.7)   10/22/17  07:20    


 


Neut % (Auto)  68.6 % (50.0-75.0)   10/22/17  07:20    


 


Lymph % (Auto)  14.1 % (20.0-40.0)  L  10/22/17  07:20    


 


Mono % (Auto)  10.7 % (0.0-10.0)  H  10/22/17  07:20    


 


Eos % (Auto)  5.7 % (0.0-4.0)  H  10/22/17  07:20    


 


Baso % (Auto)  0.9 % (0.0-2.0)   10/22/17  07:20    


 


Neut #  7.1 K/uL (1.8-7.0)  H  10/22/17  07:20    


 


Lymph #  1.5 K/uL (1.0-4.3)   10/22/17  07:20    


 


Mono #  1.1 K/uL (0.0-0.8)  H  10/22/17  07:20    


 


Eos #  0.6 K/uL (0.0-0.7)   10/22/17  07:20    


 


Baso #  0.1 K/uL (0.0-0.2)   10/22/17  07:20    


 


PT  10.8 Seconds (9.8-13.1)   10/21/17  07:30    


 


INR  1.0  (0.9-1.2)   10/21/17  07:30    


 


APTT  29.3 Seconds (25.6-37.1)   10/21/17  07:30    


 


Sodium  143 mmol/l (132-148)   10/22/17  06:00    


 


Potassium  4.8 MMOL/L (3.6-5.0)   10/22/17  06:00    


 


Chloride  111 mmol/L ()  H  10/22/17  06:00    


 


Carbon Dioxide  23 mmol/L (22-30)   10/22/17  06:00    


 


Anion Gap  14  (10-20)   10/22/17  06:00    


 


BUN  45 mg/dl (9-20)  H  10/22/17  06:00    


 


Creatinine  2.7 mg/dL (0.8-1.5)  H  10/22/17  06:00    


 


Est GFR ( Amer)  28   10/22/17  06:00    


 


Est GFR (Non-Af Amer)  24   10/22/17  06:00    


 


POC Glucose (mg/dL)  345 mg/dL ()  H  10/21/17  20:59    


 


Random Glucose  95 mg/dL ()   10/22/17  06:00    


 


Calcium  8.9 mg/dL (8.4-10.2)   10/22/17  06:00    


 


Total Bilirubin  0.1 mg/dl (0.2-1.3)  L  10/22/17  06:00    


 


AST  33 U/L (17-59)   10/22/17  06:00    


 


ALT  31 U/L (21-72)   10/22/17  06:00    


 


Alkaline Phosphatase  62 U/L ()   10/22/17  06:00    


 


Troponin I  0.0130 ng/mL (0.00-0.120)   10/21/17  23:30    


 


Total Protein  6.5 G/DL (6.3-8.2)   10/22/17  06:00    


 


Albumin  3.5 g/dL (3.5-5.0)  D 10/22/17  06:00    


 


Globulin  3.0 gm/dL (2.2-3.9)   10/22/17  06:00    


 


Albumin/Globulin Ratio  1.2  (1.0-2.1)   10/22/17  06:00    














Discharge Exam





- Head Exam


Head Exam: ATRAUMATIC, NORMAL INSPECTION, NORMOCEPHALIC





- Eye Exam


Eye Exam: EOMI, Normal appearance, PERRL


Pupil Exam: NORMAL ACCOMODATION, PERRL





- Respiratory Exam


Respiratory Exam: Clear to PA & Lateral, NORMAL BREATHING PATTERN, UNREMARKABLE





- Cardiovascular Exam


Cardiovascular Exam: REGULAR RHYTHM, RRR, +S1





- GI/Abdominal Exam


GI & Abdominal Exam: Normal Bowel Sounds, Soft, Unremarkable





- Extremities Exam


Extremities exam: full ROM, normal capillary refill, normal inspection, pedal 

pulses present





- Back Exam


Back exam: FULL ROM





- Neurological Exam


Neurological exam: Alert, CN II-XII Intact, Normal Gait, Oriented x3, Reflexes 

Normal





- Psychiatric Exam


Psychiatric exam: Normal Affect, Normal Mood





- Skin


Skin Exam: Dry, Intact, Normal Color, Warm





Discharge Plan





- Follow Up Plan


Condition: FAIR


Disposition: HOME/ ROUTINE


Additional Instructions: 


final dx-ckd, cp


FOLLOW UP WITH DR VERAS AS AN OUTPATIENT. 


no compalints/distress. nof /c, n/v/d. bw noted. trops negative


cleared by cardio/nehpro


f/u rmg 2 days, rted prn, meds per med rec

## 2017-10-22 NOTE — CP.PCM.PN
Subjective





- Date & Time of Evaluation


Date of Evaluation: 10/22/17


Time of Evaluation: 11:27





Objective





- Vital Signs/Intake and Output


Vital Signs (last 24 hours): 


 











Temp Pulse Resp BP Pulse Ox


 


 97.7 F   54 L  18   147/61   98 


 


 10/22/17 07:52  10/22/17 09:03  10/22/17 07:52  10/22/17 09:03  10/22/17 07:52








Intake and Output: 


 











 10/22/17 10/22/17





 06:59 18:59


 


Intake Total 1090 


 


Output Total 800 


 


Balance 290 














- Medications


Medications: 


 Current Medications





Amlodipine Besylate (Norvasc)  10 mg PO DAILY [facility]


   Last Admin: 10/22/17 09:03 Dose:  10 mg


Aspirin (Aspirin Chewable)  81 mg PO DAILY [facility]


   Last Admin: 10/22/17 09:03 Dose:  81 mg


Calcitriol (Rocaltrol)  0.25 mcg PO MWF [facility]


Clonidine HCl (Catapres)  0.1 mg PO BID [facility]


   Last Admin: 10/22/17 09:03 Dose:  0.1 mg


HCTZ/Losartan Potassium (Hyzaar 12.5 Mg-50 Mg)  2 tab PO DAILY [facility]


   Last Admin: 10/22/17 09:02 Dose:  2 tab


Sodium Chloride (Sodium Chloride 0.45%)  1,000 mls @ 70 mls/hr IV .[id number] [facility]


   Stop: 10/22/17 18:24


   Last Admin: 10/22/17 09:04 Dose:  70 mls/hr


Pioglitazone HCl (Actos)  15 mg PO DAILY [facility]


   Last Admin: 10/22/17 09:03 Dose:  15 mg


Sodium Bicarbonate (Sodium Bicarbonate Tab)  1,300 mg PO BID [facility]


   Last Admin: 10/22/17 09:02 Dose:  1,300 mg


Tamsulosin HCl (Flomax)  0.4 mg PO DAILY [facility]


   Last Admin: 10/22/17 09:02 Dose:  0.4 mg











- Labs


Labs: 


 





 10/22/17 07:20 





 10/22/17 06:00 





 











PT  10.8 Seconds (9.8-13.1)   10/21/17  07:30    


 


INR  1.0  (0.9-1.2)   10/21/17  07:30    


 


APTT  29.3 Seconds (25.6-37.1)   10/21/17  07:30    














Assessment and Plan


(1) DVT prophylaxis


Status: Acute   





(2) Acute chest pain


Status: Acute   





(3) Hyperkalemia


Status: Acute   





(4) CKD (chronic kidney disease)


Status: Acute

## 2017-10-22 NOTE — CARD
--------------- APPROVED REPORT --------------





EKG Measurement

Heart [id number]

NE [id number]

[id number]

[id number]

[id number]



<Conclusion>

Normal sinus rhythm

Normal ECG

## 2018-09-01 ENCOUNTER — HOSPITAL ENCOUNTER (EMERGENCY)
Dept: HOSPITAL 14 - H.ER | Age: 68
Discharge: HOME | End: 2018-09-01
Payer: MEDICARE

## 2018-09-01 VITALS
TEMPERATURE: 98.6 F | OXYGEN SATURATION: 99 % | HEART RATE: 76 BPM | RESPIRATION RATE: 18 BRPM | DIASTOLIC BLOOD PRESSURE: 60 MMHG | SYSTOLIC BLOOD PRESSURE: 139 MMHG

## 2018-09-01 DIAGNOSIS — I12.9: ICD-10-CM

## 2018-09-01 DIAGNOSIS — Z79.4: ICD-10-CM

## 2018-09-01 DIAGNOSIS — E78.00: ICD-10-CM

## 2018-09-01 DIAGNOSIS — M70.22: Primary | ICD-10-CM

## 2018-09-01 NOTE — ED PDOC
Upper Extremity Pain/Injury


Time Seen by Provider: 09/01/18 17:37


Chief Complaint (Nursing): Upper Extremity Problem/Injury


Chief Complaint (Provider): elbow swelling


History Per: Patient


History/Exam Limitations: no limitations


Additional Complaint(s): 





Griffin Bailey, a 67 year old male with no significant past medical history

, presents to the emergency department with left elbow swelling. Patient states 

he has a history of this and has gotten an injection placed. He denies any 

falls or pain. No further medical complaints.





Past Medical History


Reviewed: Historical Data, Nursing Documentation, Vital Signs


Vital Signs: 


 Last Vital Signs











Temp  98.6 F   09/01/18 17:28


 


Pulse  76   09/01/18 17:28


 


Resp  18   09/01/18 17:28


 


BP  139/60   09/01/18 17:28


 


Pulse Ox  99   09/01/18 17:28














- Medical History


PMH: Diabetes (type I and II), HTN, Hypercholesterolemia, Chronic Kidney Disease


   Denies: CAD, CVA





- Surgical History


Surgical History: Appendectomy


   Denies: CABG





- Family History


Family History: States: Unknown Family Hx





- Home Medications


Home Medications: 


 Ambulatory Orders











 Medication  Instructions  Recorded


 


cloNIDine [Catapres] 0.1 mg PO BID #0 tab 10/24/15


 


Calcitriol 1 tab PO MWF 10/21/17


 


Olmesartan/Amlodipin/Hcthiazid 2 tab PO DAILY 10/21/17





[Tribenzor 20-5-12.5 mg Tablet]  


 


Pioglitazone [Actos] 15 mg PO DAILY 10/21/17


 


Sodium Bicarbonate 2 tab PO BID 10/21/17


 


Tamsulosin [Flomax] 0.4 mg PO DAILY 10/21/17


 


Aspirin [Aspirin Chewable] 81 mg PO DAILY  chew 10/22/17


 


Naproxen 375 mg PO Q8 PRN #21 tablet 09/01/18


 


Omeprazole Magnesium [Prilosec Otc] 20 mg PO DAILY #7 tablet. 09/01/18














- Allergies


Allergies/Adverse Reactions: 


 Allergies











Allergy/AdvReac Type Severity Reaction Status Date / Time


 


No Known Allergies Allergy   Verified 09/01/18 17:27














Review of Systems


ROS Statement: Except As Marked, All Systems Reviewed And Found Negative


Musculoskeletal: Positive for: Other (elbow swelling)





Physical Exam





- Reviewed


Nursing Documentation Reviewed: Yes


Vital Signs Reviewed: Yes





- Physical Exam


Appears: Positive for: Well, Non-toxic, No Acute Distress


Cardiovascular/Chest: Positive for: Regular Rate, Rhythm


Respiratory: Positive for: Normal Breath Sounds.  Negative for: Respiratory 

Distress


Extremity: Positive for: Normal ROM (with no pain or difficulty), Swelling (

posterior elbow).  Negative for: Other (erythema)





- ECG


O2 Sat by Pulse Oximetry: 99 (RA)


Pulse Ox Interpretation: Normal





- Progress


ED Course And Treament: 





xry of elbow: olecranon bone spur noted





placed in shoulder sling





Medical Decision Making


Medical Decision Making: 


Time; 17:37


Initial Impression: elbow swelling


Initial Plan:


--Xray left elbow 3 views





--------------------------------------------------------------------------------

-----------------


Scribe Attestation:


Documented by Mica Schmitz, acting as a scribe for Misael Cameron PA-C.





Provider Scribe Attestation:


All medical record entries made by the Scribe were at my direction and 

personally dictated by me. I have reviewed the chart and agree that the record 

accurately reflects my personal performance of the history, physical exam, 

medical decision making, and the department course for this patient. I have 

also personally directed, reviewed, and agree with the discharge instructions 

and disposition.





Disposition





- Clinical Impression


Clinical Impression: 


 Bursitis, Olecranon bursitis of left elbow








- Patient ED Disposition


Is Patient to be Admitted: No





- Disposition


Referrals: 


Iman Martinez MD [Staff Provider] - 


Disposition: Routine/Home


Disposition Time: 18:23


Condition: FAIR


Prescriptions: 


Naproxen 375 mg PO Q8 PRN #21 tablet


 PRN Reason: Pain, Moderate (4-7)


Omeprazole Magnesium [Prilosec Otc] 20 mg PO DAILY #7 tablet.


Instructions:  Olecranon Bursitis (DC)


Print Language: Nepalese

## 2018-09-02 NOTE — RAD
Date of service: 



09/01/2018



PROCEDURE:  Radiographs of the left elbow.



HISTORY:

ELBOW SWELLING  



COMPARISON:

No prior.



FINDINGS:



BONES:

Normal. No fracture.



JOINTS:

Normal. No osteoarthritis.



SOFT TISSUES:

Normal.



JOINT EFFUSION:

None.



OTHER FINDINGS:

Olecranon spur.



IMPRESSION:

No fracture.

## 2022-10-21 ENCOUNTER — NEW PATIENT (OUTPATIENT)
Dept: URBAN - METROPOLITAN AREA CLINIC 110 | Facility: CLINIC | Age: 72
End: 2022-10-21

## 2022-10-21 DIAGNOSIS — Z96.1: ICD-10-CM

## 2022-10-21 DIAGNOSIS — E11.9: ICD-10-CM

## 2022-10-21 DIAGNOSIS — H35.372: ICD-10-CM

## 2022-10-21 DIAGNOSIS — H26.491: ICD-10-CM

## 2022-10-21 DIAGNOSIS — H52.4: ICD-10-CM

## 2022-10-21 PROCEDURE — 92004 COMPRE OPH EXAM NEW PT 1/>: CPT

## 2022-10-21 ASSESSMENT — VISUAL ACUITY
OS_CC: 20/100
OD_CC: 20/200
OU_CC: J1

## 2022-10-21 ASSESSMENT — TONOMETRY
OS_IOP_MMHG: 13
OD_IOP_MMHG: 12

## 2022-11-02 ENCOUNTER — ESTABLISHED (OUTPATIENT)
Dept: URBAN - METROPOLITAN AREA CLINIC 110 | Facility: CLINIC | Age: 72
End: 2022-11-02

## 2022-11-02 DIAGNOSIS — Z96.1: ICD-10-CM

## 2022-11-02 DIAGNOSIS — H35.3131: ICD-10-CM

## 2022-11-02 DIAGNOSIS — H26.491: ICD-10-CM

## 2022-11-02 DIAGNOSIS — H35.372: ICD-10-CM

## 2022-11-02 PROCEDURE — 66821 AFTER CATARACT LASER SURGERY: CPT

## 2022-11-02 PROCEDURE — 92134 CPTRZ OPH DX IMG PST SGM RTA: CPT

## 2022-11-02 PROCEDURE — 92202 OPSCPY EXTND ON/MAC DRAW: CPT

## 2022-11-02 PROCEDURE — 99214 OFFICE O/P EST MOD 30 MIN: CPT | Mod: 25

## 2022-11-02 ASSESSMENT — VISUAL ACUITY
OD_SC: 20/200
OS_SC: 20/100

## 2022-11-02 ASSESSMENT — TONOMETRY
OD_IOP_MMHG: 14
OS_IOP_MMHG: 14

## 2022-12-07 ENCOUNTER — ESTABLISHED (OUTPATIENT)
Dept: URBAN - METROPOLITAN AREA CLINIC 110 | Facility: CLINIC | Age: 72
End: 2022-12-07

## 2022-12-07 DIAGNOSIS — H26.491: ICD-10-CM

## 2022-12-07 DIAGNOSIS — H52.4: ICD-10-CM

## 2022-12-07 DIAGNOSIS — H25.812: ICD-10-CM

## 2022-12-07 DIAGNOSIS — H52.03: ICD-10-CM

## 2022-12-07 DIAGNOSIS — H52.223: ICD-10-CM

## 2022-12-07 DIAGNOSIS — H35.3131: ICD-10-CM

## 2022-12-07 DIAGNOSIS — H35.372: ICD-10-CM

## 2022-12-07 DIAGNOSIS — Z96.1: ICD-10-CM

## 2022-12-07 PROCEDURE — 92012 INTRM OPH EXAM EST PATIENT: CPT

## 2022-12-07 PROCEDURE — 92015 DETERMINE REFRACTIVE STATE: CPT

## 2022-12-07 PROCEDURE — 92202 OPSCPY EXTND ON/MAC DRAW: CPT

## 2022-12-07 ASSESSMENT — VISUAL ACUITY
OS_SC: 20/100-1
OD_SC: 20/100+1

## 2022-12-07 ASSESSMENT — TONOMETRY
OS_IOP_MMHG: 14
OD_IOP_MMHG: 13

## 2023-01-23 ENCOUNTER — ESTABLISHED (OUTPATIENT)
Dept: URBAN - METROPOLITAN AREA CLINIC 110 | Facility: CLINIC | Age: 73
End: 2023-01-23

## 2023-01-23 DIAGNOSIS — H35.3131: ICD-10-CM

## 2023-01-23 DIAGNOSIS — H35.372: ICD-10-CM

## 2023-01-23 DIAGNOSIS — H25.812: ICD-10-CM

## 2023-01-23 DIAGNOSIS — H40.032: ICD-10-CM

## 2023-01-23 DIAGNOSIS — Z96.1: ICD-10-CM

## 2023-01-23 PROCEDURE — 92020 GONIOSCOPY: CPT

## 2023-01-23 PROCEDURE — 92012 INTRM OPH EXAM EST PATIENT: CPT | Mod: 24

## 2023-01-23 PROCEDURE — 92202 OPSCPY EXTND ON/MAC DRAW: CPT

## 2023-01-23 ASSESSMENT — VISUAL ACUITY
OD_SC: 20/60
OS_PH: 20/80-1
OS_SC: 20/100+1

## 2023-01-23 ASSESSMENT — TONOMETRY
OD_IOP_MMHG: 14
OS_IOP_MMHG: 12